# Patient Record
Sex: FEMALE | Race: WHITE | Employment: OTHER | URBAN - METROPOLITAN AREA
[De-identification: names, ages, dates, MRNs, and addresses within clinical notes are randomized per-mention and may not be internally consistent; named-entity substitution may affect disease eponyms.]

---

## 2021-06-03 ENCOUNTER — TRANSFERRED RECORDS (OUTPATIENT)
Dept: HEALTH INFORMATION MANAGEMENT | Facility: CLINIC | Age: 76
End: 2021-06-03

## 2021-06-04 ENCOUNTER — TRANSCRIBE ORDERS (OUTPATIENT)
Dept: OTHER | Age: 76
End: 2021-06-04

## 2021-06-04 DIAGNOSIS — H01.131 ECZEMATOUS DERMATITIS OF RIGHT UPPER EYELID: Primary | ICD-10-CM

## 2021-06-04 DIAGNOSIS — H25.13 NUCLEAR AGE-RELATED CATARACT, BOTH EYES: ICD-10-CM

## 2021-06-04 DIAGNOSIS — H02.831 DERMATOCHALASIS OF RIGHT UPPER EYELID: ICD-10-CM

## 2021-06-30 ENCOUNTER — OFFICE VISIT (OUTPATIENT)
Dept: OPHTHALMOLOGY | Facility: CLINIC | Age: 76
End: 2021-06-30
Attending: OPHTHALMOLOGY
Payer: COMMERCIAL

## 2021-06-30 DIAGNOSIS — H02.403 INVOLUTIONAL PTOSIS, ACQUIRED, BILATERAL: Primary | ICD-10-CM

## 2021-06-30 DIAGNOSIS — H02.135 SENILE ECTROPION OF BOTH LOWER EYELIDS: ICD-10-CM

## 2021-06-30 DIAGNOSIS — H10.13 ALLERGIC CONJUNCTIVITIS OF BOTH EYES: ICD-10-CM

## 2021-06-30 DIAGNOSIS — H02.132 SENILE ECTROPION OF BOTH LOWER EYELIDS: ICD-10-CM

## 2021-06-30 PROCEDURE — 99203 OFFICE O/P NEW LOW 30 MIN: CPT | Performed by: OPHTHALMOLOGY

## 2021-06-30 RX ORDER — SIMVASTATIN 40 MG
40 TABLET ORAL DAILY
COMMUNITY
Start: 2021-04-13

## 2021-06-30 RX ORDER — LOSARTAN POTASSIUM 100 MG/1
100 TABLET ORAL DAILY
COMMUNITY
Start: 2021-04-16

## 2021-06-30 ASSESSMENT — TONOMETRY
IOP_METHOD: ICARE
OD_IOP_MMHG: 12
OS_IOP_MMHG: 14

## 2021-06-30 ASSESSMENT — VISUAL ACUITY
CORRECTION_TYPE: GLASSES
OS_CC: 20/25
OD_CC+: -1
OD_CC: 20/20
OS_CC+: -2
METHOD: SNELLEN - LINEAR

## 2021-06-30 ASSESSMENT — CONF VISUAL FIELD: COMMENTS: TANGENT VISUAL FIELD PERFORMED TODAY.

## 2021-06-30 NOTE — NURSING NOTE
Chief Complaints and History of Present Illnesses   Patient presents with     Droopy Eye Lid Evaluation       Chief Complaint(s) and History of Present Illness(es)     Droopy Eye Lid Evaluation     Laterality: right upper lid and left upper lid    Severity: moderate    Course: gradually improving              Comments     Patient referred by  for dermatochalasis evaluation. Patient states that she has been dealing with allergies causing swollen lids since March of 2021. Eyes have been burning, itching at watering quite a bit. At times lids will appear more red and swollen. Right eye gets worse than left eye. Seems to be slightly improved today.                 Tae Mo, Ophthalmic Assistant

## 2021-06-30 NOTE — PROGRESS NOTES
Oculoplastic Clinic New Patient    Patient: Swathi Chao MRN# [id number]   YOB: 1945 Age: 75 year old   Date of Visit: Jun 30, 2021              HPI:     Chief Complaint(s) and History of Present Illness(es)     Droopy Eye Lid Evaluation     Laterality: right upper lid and left upper lid    Severity: moderate    Course: gradually improving       Swathi Chao is a 75-year-old female presenting for evaluation at the request of Dr. Gerard for both upper eyelid swelling, itching, and watering.  She notes this has been a seasonal issue for her, but she feels since she received her Covid vaccination in January things have dramatically worsened.  In addition to her ocular symptoms she has systemic itching as well.  She notes she is discussed this with her primary care doctor in the past, who has told her to wait out the season and take Claritin.    She has previously tried FML, as well as Maxitrol both of which seem to have helped.  Artificial tears have not seem to help quite as much.  She is also taking Claritin which helps with her systemic itching but not so much her eyelids and eyes.  She feels in the past several weeks things have gradually been improving, though yesterday she did have some irritation and watering.  Today her eyes feel good.    On examination, her visual acuity is 20/20 the right and 20/25 in the left.  Her pupils are normal and extraocular motility is full.  She has bilateral aponeurotic ptosis.  She has prominent prolapsed nasal fat pads in both upper eyelids as well as all 3 of the fat pads in both lower eyelids.  She has moderate lower eyelid laxity with delayed snapback of both lower eyelids.  The area she points to and describes as eyelid swelling, is her prolapsed nasal fat pad in both upper eyelids.  Her conjunctiva is white and quiet in each eye.  Cornea and anterior segment examination is otherwise unremarkable.    This report was dictated using Dragon voice recognition  "software.    Assessment & Plan     Swathi Chao is a 75 year old female with the following diagnoses:   1. Involutional ptosis, acquired, bilateral    2. Allergic conjunctivitis of both eyes    3. Senile ectropion of both lower eyelids         Her biggest issue is ocular irritation, I suspect this is due to allergy given itching and systemic symptoms as well.    We discussed ptosis and the prolapsed fat pads as well as ectropion. Regarding ptosis and her prolapsed nasal fat pads she would like to observe at this time, as I don't think this will help with her ocular irritation.    Regarding the ectropion I did discuss this may help some with the watering. She will try conservative measures first, and let me know if epiphora becomes more of an issue. She will also discuss with her primary care physician whether she may benefit from evaluation with an allergist.    Patient instructions:    Patient Instructions   Pataday ophthalmic drops 1 x daily.   Use hot compresses twice a day for 1 minutes  Cool compresses through out the day can help with itching  Refresh Celluvisc 3-4 x daily.     If you would like to discuss the heavy upper eyelids and the \"fat prolapse,\" I would be happy to see you back to discuss further. As we also discussed, you have lower eyelid laxity which can cause the eyes to feel irritated. We can keep tabs on this but if tearing becomes more of an issue we can re-evaluate this.     Attending Physician Attestation: Complete documentation of historical and exam elements from today's encounter can be found in the full encounter summary report (not reduplicated in this progress note). I personally obtained the chief complaint(s) and history of present illness. I confirmed and edited as necessary the review of systems, past medical/surgical history, family history, social history, and examination findings as documented by others; and I examined the patient myself. I personally reviewed the relevant " tests, images, and reports as documented above. I formulated and edited as necessary the assessment and plan and discussed the findings and management plan with the patient. Tere Mckeon MD

## 2021-06-30 NOTE — LETTER
2021         RE:  :  MRN: Swathi Chao  1945  [id number]     Dear Dr. Gerard,    Thank you for asking me to see your patient, Swathi Chao, for an oculoplastic   consultation.  My assessment and plan are below.  For further details, please see my attached clinic note.           HPI:     Chief Complaint(s) and History of Present Illness(es)     Droopy Eye Lid Evaluation     Laterality: right upper lid and left upper lid    Severity: moderate    Course: gradually improving       Swathi Chao is a 75-year-old female presenting for evaluation at the request of Dr. Gerard for both upper eyelid swelling, itching, and watering.  She notes this has been a seasonal issue for her, but she feels since she received her Covid vaccination in January things have dramatically worsened.  In addition to her ocular symptoms she has systemic itching as well.  She notes she is discussed this with her primary care doctor in the past, who has told her to wait out the season and take Claritin.    She has previously tried FML, as well as Maxitrol both of which seem to have helped.  Artificial tears have not seem to help quite as much.  She is also taking Claritin which helps with her systemic itching but not so much her eyelids and eyes.  She feels in the past several weeks things have gradually been improving, though yesterday she did have some irritation and watering.  Today her eyes feel good.    On examination, her visual acuity is 20/20 the right and 20/25 in the left.  Her pupils are normal and extraocular motility is full.  She has bilateral aponeurotic ptosis.  She has prominent prolapsed nasal fat pads in both upper eyelids as well as all 3 of the fat pads in both lower eyelids.  She has moderate lower eyelid laxity with delayed snapback of both lower eyelids.  The area she points to and describes as eyelid swelling, is her prolapsed nasal fat pad in both upper eyelids.  Her conjunctiva is white and quiet in  "each eye.  Cornea and anterior segment examination is otherwise unremarkable.    This report was dictated using Dragon voice recognition software.    Assessment & Plan     Swathi Chao is a 75 year old female with the following diagnoses:   1. Involutional ptosis, acquired, bilateral    2. Allergic conjunctivitis of both eyes    3. Senile ectropion of both lower eyelids         Her biggest issue is ocular irritation, I suspect this is due to allergy given itching and systemic symptoms as well.    We discussed ptosis and the prolapsed fat pads as well as ectropion. Regarding ptosis and her prolapsed nasal fat pads she would like to observe at this time, as I don't think this will help with her ocular irritation.    Regarding the ectropion I did discuss this may help some with the watering. She will try conservative measures first, and let me know if epiphora becomes more of an issue. She will also discuss with her primary care physician whether she may benefit from evaluation with an allergist.    Patient instructions:    Patient Instructions   Pataday ophthalmic drops 1 x daily.   Use hot compresses twice a day for 1 minutes  Cool compresses through out the day can help with itching  Refresh Celluvisc 3-4 x daily.     If you would like to discuss the heavy upper eyelids and the \"fat prolapse,\" I would be happy to see you back to discuss further. As we also discussed, you have lower eyelid laxity which can cause the eyes to feel irritated. We can keep tabs on this but if tearing becomes more of an issue we can re-evaluate this.     Attending Physician Attestation: Complete documentation of historical and exam elements from today's encounter can be found in the full encounter summary report (not reduplicated in this progress note). I personally obtained the chief complaint(s) and history of present illness. I confirmed and edited as necessary the review of systems, past medical/surgical history, family history, " social history, and examination findings as documented by others; and I examined the patient myself. I personally reviewed the relevant tests, images, and reports as documented above. I formulated and edited as necessary the assessment and plan and discussed the findings and management plan with the patient. Tere Mckeon MD            Again, thank you for allowing me to participate in the care of your patient.      Sincerely,    Tere Mckeon MD  Department of Ophthalmology and Visual Neurosciences  [facility]    CC: Katherine Gerard  [facility]  [address] [place] [id number]  Via Fax: [phone]

## 2021-06-30 NOTE — PATIENT INSTRUCTIONS
"Pataday ophthalmic drops 1 x daily.   Use hot compresses twice a day for 1 minutes  Cool compresses through out the day can help with itching  Refresh Celluvisc 3-4 x daily.     If you would like to discuss the heavy upper eyelids and the \"fat prolapse,\" I would be happy to see you back to discuss further. As we also discussed, you have lower eyelid laxity which can cause the eyes to feel irritated. We can keep tabs on this but if tearing becomes more of an issue we can re-evaluate this.   "